# Patient Record
Sex: MALE | Race: WHITE | URBAN - METROPOLITAN AREA
[De-identification: names, ages, dates, MRNs, and addresses within clinical notes are randomized per-mention and may not be internally consistent; named-entity substitution may affect disease eponyms.]

---

## 2023-05-12 ENCOUNTER — OFFICE VISIT (OUTPATIENT)
Age: 58
End: 2023-05-12
Payer: MEDICARE

## 2023-05-12 VITALS — HEIGHT: 74 IN | BODY MASS INDEX: 25.93 KG/M2 | WEIGHT: 202 LBS

## 2023-05-12 DIAGNOSIS — M25.642 FINGER STIFFNESS, LEFT: Primary | ICD-10-CM

## 2023-05-12 DIAGNOSIS — M19.042 PRIMARY OSTEOARTHRITIS OF LEFT HAND: ICD-10-CM

## 2023-05-12 DIAGNOSIS — M15.2 DEGENERATIVE ARTHRITIS OF PROXIMAL INTERPHALANGEAL JOINT OF LITTLE FINGER OF LEFT HAND: ICD-10-CM

## 2023-05-12 DIAGNOSIS — M15.1 DEGENERATIVE ARTHRITIS OF DISTAL INTERPHALANGEAL JOINT OF LITTLE FINGER OF LEFT HAND: ICD-10-CM

## 2023-05-12 DIAGNOSIS — M18.12 PRIMARY OSTEOARTHRITIS OF FIRST CARPOMETACARPAL JOINT OF LEFT HAND: ICD-10-CM

## 2023-05-12 PROCEDURE — 99203 OFFICE O/P NEW LOW 30 MIN: CPT | Performed by: ORTHOPAEDIC SURGERY

## 2023-05-12 PROCEDURE — G8419 CALC BMI OUT NRM PARAM NOF/U: HCPCS | Performed by: ORTHOPAEDIC SURGERY

## 2023-05-12 PROCEDURE — G8427 DOCREV CUR MEDS BY ELIG CLIN: HCPCS | Performed by: ORTHOPAEDIC SURGERY

## 2023-05-12 PROCEDURE — 73130 X-RAY EXAM OF HAND: CPT | Performed by: ORTHOPAEDIC SURGERY

## 2023-05-12 PROCEDURE — 1036F TOBACCO NON-USER: CPT | Performed by: ORTHOPAEDIC SURGERY

## 2023-05-12 PROCEDURE — 3017F COLORECTAL CA SCREEN DOC REV: CPT | Performed by: ORTHOPAEDIC SURGERY

## 2024-05-04 SDOH — HEALTH STABILITY: PHYSICAL HEALTH: ON AVERAGE, HOW MANY MINUTES DO YOU ENGAGE IN EXERCISE AT THIS LEVEL?: 60 MIN

## 2024-05-04 SDOH — HEALTH STABILITY: PHYSICAL HEALTH: ON AVERAGE, HOW MANY DAYS PER WEEK DO YOU ENGAGE IN MODERATE TO STRENUOUS EXERCISE (LIKE A BRISK WALK)?: 3 DAYS

## 2024-05-06 ENCOUNTER — OFFICE VISIT (OUTPATIENT)
Age: 59
End: 2024-05-06
Payer: MEDICARE

## 2024-05-06 VITALS
HEIGHT: 74 IN | BODY MASS INDEX: 25.57 KG/M2 | WEIGHT: 199.2 LBS | OXYGEN SATURATION: 98 % | TEMPERATURE: 98.2 F | RESPIRATION RATE: 18 BRPM | HEART RATE: 71 BPM

## 2024-05-06 DIAGNOSIS — G57.02 PIRIFORMIS SYNDROME, LEFT: ICD-10-CM

## 2024-05-06 DIAGNOSIS — E85.1 AMYLOID NEUROPATHY (HCC): Primary | ICD-10-CM

## 2024-05-06 DIAGNOSIS — G63 AMYLOID NEUROPATHY (HCC): Primary | ICD-10-CM

## 2024-05-06 DIAGNOSIS — R26.81 GAIT INSTABILITY: ICD-10-CM

## 2024-05-06 DIAGNOSIS — R29.898 WEAKNESS OF RIGHT LEG: ICD-10-CM

## 2024-05-06 PROCEDURE — 3017F COLORECTAL CA SCREEN DOC REV: CPT | Performed by: PHYSICAL MEDICINE & REHABILITATION

## 2024-05-06 PROCEDURE — 1036F TOBACCO NON-USER: CPT | Performed by: PHYSICAL MEDICINE & REHABILITATION

## 2024-05-06 PROCEDURE — G8419 CALC BMI OUT NRM PARAM NOF/U: HCPCS | Performed by: PHYSICAL MEDICINE & REHABILITATION

## 2024-05-06 PROCEDURE — G8427 DOCREV CUR MEDS BY ELIG CLIN: HCPCS | Performed by: PHYSICAL MEDICINE & REHABILITATION

## 2024-05-06 PROCEDURE — 99204 OFFICE O/P NEW MOD 45 MIN: CPT | Performed by: PHYSICAL MEDICINE & REHABILITATION

## 2024-05-06 NOTE — PROGRESS NOTES
Placido Garrison presents today for   Chief Complaint   Patient presents with    Pain    Leg Pain    Hip Pain       Is someone accompanying this pt? no    Is the patient using any DME equipment during OV? Yes cane    Depression Screening:       No data to display                Learning Assessment:  Failed to redirect to the Timeline version of the [employer] SmartLink.    Abuse Screening:       No data to display                Fall Risk  Failed to redirect to the Timeline version of the [employer] SmartLink.    OPIOID RISK TOOL  Failed to redirect to the Timeline version of the [employer] SmartLink.    Coordination of Care:  1. Have you been to the ER, urgent care clinic since your last visit? no  Hospitalized since your last visit? no    2. Have you seen or consulted any other health care providers outside of the [facility] since your last visit? no Include any pap smears or colon screening. no

## 2024-05-06 NOTE — PROGRESS NOTES
[facility]  [address], [address]  [place] [address]  Phone: [phone]  Fax: [phone]      Patient: Placido Garrison                                                                              MRN: [id number]        YOB: 1965          AGE: 58 y.o.             PCP: Malcolm Ortega MD  Date:  05/07/24    Reason for Consultation: Pain, Leg Pain, and Hip Pain      HPI:  Placido Garrison is a 58 y.o. 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  He was initially seen at [facility] and now is followed by neurologist Dr. Arsenio Sim at the [facility]. He has been treated  with rituxumab 10/2021 -5/2023 and bendamustine infusions 2/2023 5/2023.      Over the years he has used various orthotic devices initially and AFO (2013)  and as weakness progressed a KAFO 51913).  He has tried various  Ottobock-braces-   Walk on  Free Walk (2912-3669-  brace required biweekly maintenance the metal ankle stirrup fractured from metal fatigue) and he had frequent falls while using this brace.  E- Mag- 2/2018- 1/13/2020- numerous failures with this brace. Was working at [facility] with P&O specialist Bayron Hector who fel the energy from gate during heal strike was transferred through the locked joint into the upright   of the brace resulting in the upright in the upright fracturing in the lower brace.  The brace had numerous repairs and due to his activity level the brace was not durable.  He also had multiple falls with this brace    C-brace- in 2020 the brace was ordered but denied by insurance. Through [facility] he was able to enrol in a 30 week study  beginning 12/7/2021- ending 6/30/2022 with the c-brace.  He has retained the brace after the study.

## 2024-07-08 ENCOUNTER — OFFICE VISIT (OUTPATIENT)
Age: 59
End: 2024-07-08
Payer: MEDICARE

## 2024-07-08 VITALS
HEIGHT: 74 IN | SYSTOLIC BLOOD PRESSURE: 123 MMHG | TEMPERATURE: 98 F | DIASTOLIC BLOOD PRESSURE: 75 MMHG | HEART RATE: 74 BPM | OXYGEN SATURATION: 97 % | WEIGHT: 199.52 LBS | BODY MASS INDEX: 25.61 KG/M2

## 2024-07-08 DIAGNOSIS — E85.1 AMYLOID NEUROPATHY (HCC): ICD-10-CM

## 2024-07-08 DIAGNOSIS — R29.898 WEAKNESS OF RIGHT LEG: ICD-10-CM

## 2024-07-08 DIAGNOSIS — G63 AMYLOID NEUROPATHY (HCC): ICD-10-CM

## 2024-07-08 DIAGNOSIS — M53.3 SACROILIAC JOINT DYSFUNCTION: Primary | ICD-10-CM

## 2024-07-08 PROCEDURE — 3017F COLORECTAL CA SCREEN DOC REV: CPT | Performed by: PHYSICAL MEDICINE & REHABILITATION

## 2024-07-08 PROCEDURE — 99214 OFFICE O/P EST MOD 30 MIN: CPT | Performed by: PHYSICAL MEDICINE & REHABILITATION

## 2024-07-08 PROCEDURE — 1036F TOBACCO NON-USER: CPT | Performed by: PHYSICAL MEDICINE & REHABILITATION

## 2024-07-08 PROCEDURE — G8427 DOCREV CUR MEDS BY ELIG CLIN: HCPCS | Performed by: PHYSICAL MEDICINE & REHABILITATION

## 2024-07-08 PROCEDURE — G8419 CALC BMI OUT NRM PARAM NOF/U: HCPCS | Performed by: PHYSICAL MEDICINE & REHABILITATION

## 2024-07-08 NOTE — PROGRESS NOTES
Placido Garrison presents today for   Chief Complaint   Patient presents with    Lower Back Pain    Buttocks Pain     left       Is someone accompanying this pt? no    Is the patient using any DME equipment during OV? Yes, cane       Coordination of Care:  1. Have you been to the ER, urgent care clinic since your last visit? no  Hospitalized since your last visit? no    2. Have you seen or consulted any other health care providers outside of the [facility] since your last visit? Yes, oncology  Include any pap smears or colon screening. no

## 2024-07-08 NOTE — PROGRESS NOTES
[facility]  [address], [address]  [place] [address]  Phone: [phone]  Fax: [phone]      Patient: Placido Garrison                                                                              MRN: [id number]        YOB: 1965          AGE: 59 y.o.             PCP: Malcolm Ortega MD  Date:  07/08/24    Reason for Consultation: Lower Back Pain and Buttocks Pain (left)      HPI:  Placido Garrison is a 59 y.o. 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  He was initially seen at [facility] and now is followed by neurologist Dr. Arsenio Sim at the [facility]. He has been treated  with rituxumab 10/2021 -5/2023 and bendamustine infusions 2/2023 5/2023.      Over the years he has used various orthotic devices initially and AFO (2013)  and as weakness progressed a KAFO 02129).  He has tried various  Ottobock-braces-   Walk on  Free Walk (6680-9483-  brace required biweekly maintenance the metal ankle stirrup fractured from metal fatigue) and he had frequent falls while using this brace.  E- Mag- 2/2018- 1/13/2020- numerous failures with this brace. Was working at [facility] with P&O specialist Bayron Hector who fel the energy from gate during heal strike was transferred through the locked joint into the upright   of the brace resulting in the upright in the upright fracturing in the lower brace.  The brace had numerous repairs and due to his activity level the brace was not durable.  He also had multiple falls with this brace    C-brace- in 2020 the brace was ordered but denied by insurance. Through [facility] he was able to enrol in a 30 week study  beginning 12/7/2021- ending 6/30/2022 with the c-brace.  He has retained the brace after

## 2024-07-10 ENCOUNTER — TELEPHONE (OUTPATIENT)
Age: 59
End: 2024-07-10

## 2024-07-10 DIAGNOSIS — R29.898 WEAKNESS OF RIGHT LEG: Primary | ICD-10-CM

## 2024-07-10 DIAGNOSIS — E85.1 AMYLOID NEUROPATHY (HCC): ICD-10-CM

## 2024-07-10 DIAGNOSIS — R26.81 GAIT INSTABILITY: ICD-10-CM

## 2024-07-10 DIAGNOSIS — G63 AMYLOID NEUROPATHY (HCC): ICD-10-CM

## 2024-07-10 NOTE — TELEPHONE ENCOUNTER
Patient called to request his PT referral be sent to [facility] for scheduling.    Fax: [phone]  Attn: Adriana Harrington